# Patient Record
Sex: MALE | Race: WHITE | NOT HISPANIC OR LATINO | URBAN - METROPOLITAN AREA
[De-identification: names, ages, dates, MRNs, and addresses within clinical notes are randomized per-mention and may not be internally consistent; named-entity substitution may affect disease eponyms.]

---

## 2023-09-12 ENCOUNTER — EMERGENCY (EMERGENCY)
Facility: HOSPITAL | Age: 28
LOS: 1 days | Discharge: HOME | End: 2023-09-12
Attending: STUDENT IN AN ORGANIZED HEALTH CARE EDUCATION/TRAINING PROGRAM | Admitting: STUDENT IN AN ORGANIZED HEALTH CARE EDUCATION/TRAINING PROGRAM
Payer: SELF-PAY

## 2023-09-12 VITALS
OXYGEN SATURATION: 98 % | WEIGHT: 190.04 LBS | DIASTOLIC BLOOD PRESSURE: 80 MMHG | TEMPERATURE: 98 F | SYSTOLIC BLOOD PRESSURE: 120 MMHG | RESPIRATION RATE: 18 BRPM | HEART RATE: 82 BPM

## 2023-09-12 VITALS
RESPIRATION RATE: 17 BRPM | OXYGEN SATURATION: 98 % | DIASTOLIC BLOOD PRESSURE: 79 MMHG | SYSTOLIC BLOOD PRESSURE: 119 MMHG | HEART RATE: 68 BPM | TEMPERATURE: 98 F

## 2023-09-12 PROCEDURE — 99284 EMERGENCY DEPT VISIT MOD MDM: CPT

## 2023-09-12 PROCEDURE — 99283 EMERGENCY DEPT VISIT LOW MDM: CPT

## 2023-09-12 RX ORDER — FAMOTIDINE 10 MG/ML
20 INJECTION INTRAVENOUS ONCE
Refills: 0 | Status: COMPLETED | OUTPATIENT
Start: 2023-09-12 | End: 2023-09-12

## 2023-09-12 RX ORDER — MECLIZINE HCL 12.5 MG
25 TABLET ORAL ONCE
Refills: 0 | Status: COMPLETED | OUTPATIENT
Start: 2023-09-12 | End: 2023-09-12

## 2023-09-12 RX ADMIN — Medication 25 MILLIGRAM(S): at 14:17

## 2023-09-12 RX ADMIN — Medication 30 MILLILITER(S): at 14:16

## 2023-09-12 RX ADMIN — FAMOTIDINE 20 MILLIGRAM(S): 10 INJECTION INTRAVENOUS at 14:17

## 2023-09-12 NOTE — ED PROVIDER NOTE - ST/T WAVE
no ischemic changes, +GWNY, favor normal anatomic variant over LVH no ischemic changes, +GWYN, favor normal anatomic variant over LVH,  no dagger Q waves in lateral leads

## 2023-09-12 NOTE — ED PROVIDER NOTE - PROGRESS NOTE DETAILS
Brendan Mccann MD: Patient reassessed, feels better, trialed ambulation, able to walk without issues, not feeling dizzy. Will dc with ENT f/u. Strict return precautions given.

## 2023-09-12 NOTE — ED ADULT NURSE NOTE - NSFALLUNIVINTERV_ED_ALL_ED
Bed/Stretcher in lowest position, wheels locked, appropriate side rails in place/Call bell, personal items and telephone in reach/Instruct patient to call for assistance before getting out of bed/chair/stretcher/Non-slip footwear applied when patient is off stretcher/[place] to call system/Physically safe environment - no spills, clutter or unnecessary equipment/Purposeful proactive rounding/Room/bathroom lighting operational, light cord in reach

## 2023-09-12 NOTE — ED PROVIDER NOTE - CLINICAL SUMMARY MEDICAL DECISION MAKING FREE TEXT BOX
27 year old male with history of GERD presenting with dizziness x 3d. Well appearing here, ambulatory, neurologically intact on exam, +elicitable unidirectal horizontal nystagmus that fatigues, diagnostic of BPPV in setting of vertiginious dizziness. Do not suspect CVA vs TIA vs cardiac arrythmia--patient without any presyncopal/syncopal symptoms, no chest pain/palpitations, symptoms are specifically vertiginous. Will treat symptomatically and reassess.    Patient concerned regarding "ball in stomach"--clarified that the palpable structure in epigastric region below sternum is his xiphoid process.

## 2023-09-12 NOTE — ED PROVIDER NOTE - OBJECTIVE STATEMENT
27 year old male with history of GERD presenting with dizziness x 3d. States having spontaneous onset on Friday of feeling like the room is moving/spinning around him intermittently when he moves/walks. Has never had vertigo before but father has history of vertigo. Went to  and sent here for further eval. Denies any associated chest pain, diaphoresis, palpitations, syncope. Dizziness is specifically described as movement sensation/imbalance that completely resolves at rest, currently does not have. No ear pain, tinnitus, recent illness, fevers. No numbness, focal weakness, vision changes.

## 2023-09-12 NOTE — ED PROVIDER NOTE - PHYSICAL EXAMINATION
Gen - NAD; well-appearing; A+Ox3   HEENT - NCAT, EOMI, moist mucous membranes, clear oropharynx, fatigable leftward nystagmus  Neck - supple  Resp - CTAB, no increased WOB  CV -  RRR, no m/r/g  Abd - soft, NT, ND, palpable xiphoid process; no guarding or rebound  Back - no midline, paraspinous, or CVA tenderness  MSK - FROM of b/l UE and LE, no gross deformities  Extrem - no LE edema/erythema/tenderness  Neuro - CN2-12 grossly intact, full motor strength and sensation to LT throughout, normal finger to nose, no pronator drift, normal gait  Skin - warm, well perfused

## 2023-09-12 NOTE — ED PROVIDER NOTE - NSFOLLOWUPINSTRUCTIONS_ED_ALL_ED_FT
You were seen in the Emergency Department for: peripheral vertigo and gastritis    Continue to take Meclizine (trade name Antivert) over the counter for your symptoms.    You can also take antacids such as pepcid and/or maalox to help relieve your acid reflux symptoms.    Please follow up with your primary physician as well as an ENT specialist as discussed. If you do not have a primary physician or specialist of your needs, please call [phone] to find one convenient for you. At this number you will be able to locate a provider who accepts your insurance, as well as locate the right specialist for your needs.    You should return to the Emergency Department if you feel any new/worsening/persistent symptoms including but not limited to: chest pain, difficulty breathing, loss of consciousness, bleeding, uncontrolled pain, numbness/weakness of a body part

## 2023-09-12 NOTE — ED PROVIDER NOTE - PATIENT PORTAL LINK FT
You can access the FollowMyHealth Patient Portal offered by [facility] by registering at the following website: [email or web address]. By joining [facility]’s FollowMyHealth portal, you will also be able to view your health information using other applications (apps) compatible with our system.

## 2023-09-12 NOTE — ED ADULT NURSE NOTE - OBJECTIVE STATEMENT
27M PMH GERD presents c/o "I felt like I was going to fall over, I got SOB and hot" on friday while walking outside. denies syncopal episode at that time. pt states similar symptoms happened this AM in the shower prompting ED visit. denies neck/back pain, visual changes, unsteady gait, CP/palpitations, n/v, fever/chills, cough/congestion or unilateral weakness. pt speaking in clear, complete sentences. RR easy, even, un-labored on room air. EKG completed.

## 2023-09-12 NOTE — ED PROVIDER NOTE - NSFOLLOWUPCLINICS_GEN_ALL_ED_FT
[facility]  Otolaryngology (ENT)  [address], [address]  [place] [address]  Phone: [phone]  Fax:

## 2023-09-16 DIAGNOSIS — R42 DIZZINESS AND GIDDINESS: ICD-10-CM

## 2023-09-16 DIAGNOSIS — K21.9 GASTRO-ESOPHAGEAL REFLUX DISEASE WITHOUT ESOPHAGITIS: ICD-10-CM

## 2023-10-03 ENCOUNTER — HOSPITAL ENCOUNTER (EMERGENCY)
Facility: HOSPITAL | Age: 28
Discharge: LEFT BEFORE DISCHARGE | End: 2023-10-03
Payer: COMMERCIAL

## 2023-10-03 PROCEDURE — 4500999001 HC ED NO CHARGE

## 2023-10-04 RX ORDER — MULTIVIT-MIN/FOLIC/VIT K/LYCOP 400-300MCG
TABLET ORAL
COMMUNITY

## 2023-10-05 ENCOUNTER — APPOINTMENT (OUTPATIENT)
Dept: GASTROENTEROLOGY | Facility: CLINIC | Age: 28
End: 2023-10-05
Payer: COMMERCIAL

## 2023-10-13 ENCOUNTER — APPOINTMENT (OUTPATIENT)
Dept: GASTROENTEROLOGY | Facility: CLINIC | Age: 28
End: 2023-10-13
Payer: COMMERCIAL
